# Patient Record
Sex: MALE | Race: BLACK OR AFRICAN AMERICAN | URBAN - METROPOLITAN AREA
[De-identification: names, ages, dates, MRNs, and addresses within clinical notes are randomized per-mention and may not be internally consistent; named-entity substitution may affect disease eponyms.]

---

## 2023-01-06 ENCOUNTER — WEB ENCOUNTER (OUTPATIENT)
Dept: URBAN - METROPOLITAN AREA CLINIC 88 | Facility: CLINIC | Age: 33
End: 2023-01-06

## 2023-01-06 ENCOUNTER — OFFICE VISIT (OUTPATIENT)
Dept: URBAN - METROPOLITAN AREA CLINIC 88 | Facility: CLINIC | Age: 33
End: 2023-01-06
Payer: COMMERCIAL

## 2023-01-06 ENCOUNTER — LAB OUTSIDE AN ENCOUNTER (OUTPATIENT)
Dept: URBAN - METROPOLITAN AREA CLINIC 88 | Facility: CLINIC | Age: 33
End: 2023-01-06

## 2023-01-06 VITALS
BODY MASS INDEX: 26.84 KG/M2 | DIASTOLIC BLOOD PRESSURE: 70 MMHG | WEIGHT: 187.5 LBS | HEIGHT: 70 IN | TEMPERATURE: 97.7 F | HEART RATE: 73 BPM | SYSTOLIC BLOOD PRESSURE: 108 MMHG

## 2023-01-06 DIAGNOSIS — Z79.1 NSAID LONG-TERM USE: ICD-10-CM

## 2023-01-06 DIAGNOSIS — R10.10 UPPER ABDOMINAL PAIN: ICD-10-CM

## 2023-01-06 DIAGNOSIS — K59.00 COLONIC CONSTIPATION: ICD-10-CM

## 2023-01-06 DIAGNOSIS — R11.0 NAUSEA: ICD-10-CM

## 2023-01-06 PROCEDURE — 99204 OFFICE O/P NEW MOD 45 MIN: CPT | Performed by: INTERNAL MEDICINE

## 2023-01-06 RX ORDER — PANTOPRAZOLE SODIUM 40 MG/1
1 TABLET TABLET, DELAYED RELEASE ORAL
Qty: 90 | Refills: 1 | OUTPATIENT
Start: 2023-01-06

## 2023-01-06 RX ORDER — POLYETHYLENE GLYCOL 3350, SODIUM CHLORIDE, SODIUM BICARBONATE, POTASSIUM CHLORIDE 420; 11.2; 5.72; 1.48 G/4L; G/4L; G/4L; G/4L
MIX AS DIRECTED.  DRINK ONE CUP EVERY 15 MINUTES UNTIL COMPLETE FOR BOWEL CLEANSING POWDER, FOR SOLUTION ORAL ONCE
Qty: 1 | Refills: 0 | OUTPATIENT
Start: 2023-01-06 | End: 2023-01-07

## 2023-01-06 NOTE — PHYSICAL EXAM GASTROINTESTINAL
Abdomen , soft, epigastric and LLQ tenderness, nondistended , no guarding or rigidity , no masses palpable , normal bowel sounds , Liver and Spleen: no hepatosplenomegaly

## 2023-01-06 NOTE — HPI-TODAY'S VISIT:
Presents today as a follow up from [facility] for evaluation of abdominal pain.  Evaluated in ED on 12/22/2022 due to worsening abdominal pain, nausea and vomiting.  CT A/P 12/22/2022 revealed no acute inflammatory etiologies but moderate stool  retention noted. Labs:  WBC 6.20, Hgb 13.9, MCV 92, , Normal LFTs, +THC Describes marijuana use as being sparingly.  Was discharged home on Carafate suspension and zofran.  Instructed to follow up with GI.    States pain has been occuring intermittently for over year.  Over the last month, feels pain has increased in frequency.  Abdominal pain is primarily to lower abdomen and epigastric region.  Describes it as a fullness that is worse after eating.  Nausea w/o vomiting also voiced.  Lying supine helps to alleviate his complaints.  Admits to excessive use of NSAIDs (specifically Goody's powders) that started in his early adulthood to help manage headaches.  Has changed diet since ER visit, eliminating pork and beef.  Feels since eliminating pork feels headaches are less.  Was also on medication for anxiety and PTSD.   Feels medications contributed to his constipation.  Discontinued use about 3 weeks ago.  Defecation occurs daily to every other day.  May not achieve a complete sense of evacution with defecation.  Unsure if signs of melena has been present since he does not observe his stool.  Denies prior colonoscopy and EGD.

## 2023-01-11 PROBLEM — 35298007: Status: ACTIVE | Noted: 2023-01-06

## 2023-01-23 ENCOUNTER — CLAIMS CREATED FROM THE CLAIM WINDOW (OUTPATIENT)
Dept: URBAN - METROPOLITAN AREA CLINIC 4 | Facility: CLINIC | Age: 33
End: 2023-01-23
Payer: COMMERCIAL

## 2023-01-23 ENCOUNTER — TELEPHONE ENCOUNTER (OUTPATIENT)
Dept: URBAN - METROPOLITAN AREA SURGERY CENTER 24 | Facility: SURGERY CENTER | Age: 33
End: 2023-01-23

## 2023-01-23 ENCOUNTER — CLAIMS CREATED FROM THE CLAIM WINDOW (OUTPATIENT)
Dept: URBAN - METROPOLITAN AREA SURGERY CENTER 24 | Facility: SURGERY CENTER | Age: 33
End: 2023-01-23
Payer: COMMERCIAL

## 2023-01-23 DIAGNOSIS — K29.60 ADENOPAPILLOMATOSIS GASTRICA: ICD-10-CM

## 2023-01-23 DIAGNOSIS — K21.9 GASTRO-ESOPHAGEAL REFLUX DISEASE WITHOUT ESOPHAGITIS: ICD-10-CM

## 2023-01-23 DIAGNOSIS — K26.3 ACUTE DUODENAL ULCER: ICD-10-CM

## 2023-01-23 DIAGNOSIS — K29.80 ACUTE DUODENITIS: ICD-10-CM

## 2023-01-23 DIAGNOSIS — K21.9 ACID REFLUX: ICD-10-CM

## 2023-01-23 DIAGNOSIS — K29.81 DUODENITIS WITH BLEEDING: ICD-10-CM

## 2023-01-23 PROCEDURE — 43239 EGD BIOPSY SINGLE/MULTIPLE: CPT | Performed by: INTERNAL MEDICINE

## 2023-01-23 PROCEDURE — 88305 TISSUE EXAM BY PATHOLOGIST: CPT | Performed by: PATHOLOGY

## 2023-01-23 PROCEDURE — 88312 SPECIAL STAINS GROUP 1: CPT | Performed by: PATHOLOGY

## 2023-01-23 PROCEDURE — 88342 IMHCHEM/IMCYTCHM 1ST ANTB: CPT | Performed by: PATHOLOGY

## 2023-01-23 PROCEDURE — G8907 PT DOC NO EVENTS ON DISCHARG: HCPCS | Performed by: INTERNAL MEDICINE

## 2023-01-23 RX ORDER — PANTOPRAZOLE SODIUM 40 MG/1
1 TABLET TABLET, DELAYED RELEASE ORAL
Qty: 90 | Refills: 1 | Status: ACTIVE | COMMUNITY
Start: 2023-01-06

## 2023-02-09 ENCOUNTER — TELEPHONE ENCOUNTER (OUTPATIENT)
Dept: URBAN - METROPOLITAN AREA CLINIC 92 | Facility: CLINIC | Age: 33
End: 2023-02-09

## 2023-02-09 RX ORDER — PANTOPRAZOLE SODIUM 40 MG/1
1 TABLET TABLET, DELAYED RELEASE ORAL ONCE A DAY
Qty: 14 TABLET | Refills: 0 | OUTPATIENT
Start: 2023-02-09

## 2023-02-09 RX ORDER — AMOXICILLIN 500 MG/1
2 CAPSULES CAPSULE ORAL
Qty: 56 CAPSULE | Refills: 0 | OUTPATIENT
Start: 2023-02-09 | End: 2023-02-23

## 2023-02-09 RX ORDER — CLARITHROMYCIN 500 MG/1
1 TABLET TABLET, FILM COATED ORAL
Qty: 28 TABLET | Refills: 0 | OUTPATIENT
Start: 2023-02-09 | End: 2023-02-23

## 2023-02-23 ENCOUNTER — OFFICE VISIT (OUTPATIENT)
Dept: URBAN - METROPOLITAN AREA CLINIC 70 | Facility: CLINIC | Age: 33
End: 2023-02-23
Payer: COMMERCIAL

## 2023-02-23 VITALS
BODY MASS INDEX: 26 KG/M2 | DIASTOLIC BLOOD PRESSURE: 82 MMHG | TEMPERATURE: 97.8 F | SYSTOLIC BLOOD PRESSURE: 125 MMHG | WEIGHT: 181.6 LBS | HEIGHT: 70 IN | HEART RATE: 56 BPM

## 2023-02-23 DIAGNOSIS — K26.9 DUODENAL ULCER: ICD-10-CM

## 2023-02-23 DIAGNOSIS — A04.8 H. PYLORI INFECTION: ICD-10-CM

## 2023-02-23 DIAGNOSIS — R10.13 EPIGASTRIC ABDOMINAL PAIN: ICD-10-CM

## 2023-02-23 PROBLEM — 51868009: Status: ACTIVE | Noted: 2023-02-23

## 2023-02-23 PROCEDURE — 99214 OFFICE O/P EST MOD 30 MIN: CPT

## 2023-02-23 RX ORDER — CLARITHROMYCIN 500 MG/1
1 TABLET TABLET, FILM COATED ORAL
Qty: 28 TABLET | Refills: 0 | Status: ON HOLD | COMMUNITY
Start: 2023-02-09 | End: 2023-02-23

## 2023-02-23 RX ORDER — CLARITHROMYCIN 500 MG/1
1 TABLET TABLET, FILM COATED ORAL
Qty: 28 TABLET | Refills: 0 | OUTPATIENT
Start: 2023-02-23 | End: 2023-03-09

## 2023-02-23 RX ORDER — PANTOPRAZOLE SODIUM 40 MG/1
1 TABLET TABLET, DELAYED RELEASE ORAL
Qty: 90 | Refills: 1 | Status: ON HOLD | COMMUNITY
Start: 2023-01-06

## 2023-02-23 RX ORDER — AMOXICILLIN 500 MG/1
2 CAPSULES CAPSULE ORAL
Qty: 56 CAPSULE | Refills: 0 | Status: ON HOLD | COMMUNITY
Start: 2023-02-09 | End: 2023-02-23

## 2023-02-23 RX ORDER — PANTOPRAZOLE SODIUM 40 MG/1
1 TABLET TABLET, DELAYED RELEASE ORAL ONCE A DAY
Qty: 14 TABLET | Refills: 0 | Status: ON HOLD | COMMUNITY
Start: 2023-02-09

## 2023-02-23 RX ORDER — PANTOPRAZOLE SODIUM 40 MG/1
1 TABLET TABLET, DELAYED RELEASE ORAL TWICE A DAY
Qty: 28 TABLET | Refills: 0 | OUTPATIENT
Start: 2023-02-23

## 2023-02-23 RX ORDER — AMOXICILLIN 500 MG/1
2 CAPSULES CAPSULE ORAL EVERY 12 HOURS
Qty: 56 CAPSULE | Refills: 0 | OUTPATIENT
Start: 2023-02-23 | End: 2023-03-09

## 2023-02-23 NOTE — HPI-TODAY'S VISIT:
The pt presents for a procedure f/u.  EGD 1/23/2023 showed mildly severe esophagitis, gastritis, and nonbleeding duodenal ulcer.  Biopsies were positive for H. pylori.  Treatment was sent to the pt pharmacy after path results were received.  Today he states that he never recieved procedure results and was unaware of the medications sent to his pharmacy.  He admits to continued epigastric abdominal pain and decreased appetite. He state has discontinued NSAID use since his LOV.  Denies melena, nausea, or vomiting.  Constipation has improved and he admits to a daily bowel movement.

## 2023-02-23 NOTE — HPI-OTHER HISTORIES
OV 1/6/2023 w/ NIRMALA Galankenya: Presents today as a follow up from [facility] for evaluation of abdominal pain.  Evaluated in ED on 12/22/2022 due to worsening abdominal pain, nausea and vomiting.  CT A/P 12/22/2022 revealed no acute inflammatory etiologies but moderate stool  retention noted. Labs:  WBC 6.20, Hgb 13.9, MCV 92, , Normal LFTs, +THC Describes marijuana use as being sparingly.  Was discharged home on Carafate suspension and zofran.  Instructed to follow up with GI.    States pain has been occuring intermittently for over year.  Over the last month, feels pain has increased in frequency.  Abdominal pain is primarily to lower abdomen and epigastric region.  Describes it as a fullness that is worse after eating.  Nausea w/o vomiting also voiced.  Lying supine helps to alleviate his complaints.  Admits to excessive use of NSAIDs (specifically Goody's powders) that started in his early adulthood to help manage headaches.  Has changed diet since ER visit, eliminating pork and beef.  Feels since eliminating pork feels headaches are less.  Was also on medication for anxiety and PTSD.   Feels medications contributed to his constipation.  Discontinued use about 3 weeks ago.  Defecation occurs daily to every other day.  May not achieve a complete sense of evacution with defecation.  Unsure if signs of melena has been present since he does not observe his stool.  Denies prior colonoscopy and EGD.

## 2023-02-27 ENCOUNTER — TELEPHONE ENCOUNTER (OUTPATIENT)
Dept: URBAN - METROPOLITAN AREA CLINIC 70 | Facility: CLINIC | Age: 33
End: 2023-02-27

## 2023-02-27 RX ORDER — LEVOFLOXACIN 500 MG/1
1 TABLET TABLET, FILM COATED ORAL ONCE A DAY
Qty: 14 TABLET | Refills: 0 | OUTPATIENT
Start: 2023-02-27 | End: 2023-03-13

## 2023-02-27 RX ORDER — CLARITHROMYCIN 500 MG/1
1 TABLET TABLET, FILM COATED ORAL
OUTPATIENT
Start: 2023-02-23 | End: 2023-03-09

## 2023-02-27 RX ORDER — PANTOPRAZOLE SODIUM 40 MG/1
1 TABLET TABLET, DELAYED RELEASE ORAL TWICE A DAY
Qty: 28 TABLET | Refills: 0 | OUTPATIENT
Start: 2023-02-27

## 2023-02-27 RX ORDER — AMOXICILLIN 250 MG/1
3 CAPSULES CAPSULE ORAL THREE TIMES A DAY
Qty: 126 CAPSULE | Refills: 0 | OUTPATIENT
Start: 2023-02-27 | End: 2023-03-13

## 2023-03-15 PROBLEM — 266433003: Status: ACTIVE | Noted: 2023-03-15

## 2023-03-16 ENCOUNTER — OFFICE VISIT (OUTPATIENT)
Dept: URBAN - METROPOLITAN AREA CLINIC 88 | Facility: CLINIC | Age: 33
End: 2023-03-16
Payer: COMMERCIAL

## 2023-03-16 ENCOUNTER — LAB OUTSIDE AN ENCOUNTER (OUTPATIENT)
Dept: URBAN - METROPOLITAN AREA CLINIC 88 | Facility: CLINIC | Age: 33
End: 2023-03-16

## 2023-03-16 VITALS
BODY MASS INDEX: 25.51 KG/M2 | DIASTOLIC BLOOD PRESSURE: 72 MMHG | TEMPERATURE: 98.1 F | HEART RATE: 60 BPM | SYSTOLIC BLOOD PRESSURE: 116 MMHG | WEIGHT: 178.2 LBS | HEIGHT: 70 IN

## 2023-03-16 DIAGNOSIS — R10.32 LEFT LOWER QUADRANT ABDOMINAL PAIN: ICD-10-CM

## 2023-03-16 DIAGNOSIS — K26.9 DUODENAL ULCER: ICD-10-CM

## 2023-03-16 DIAGNOSIS — K59.00 COLONIC CONSTIPATION: ICD-10-CM

## 2023-03-16 DIAGNOSIS — R14.0 BLOATING: ICD-10-CM

## 2023-03-16 DIAGNOSIS — A04.8 HELICOBACTER PYLORI INFECTION: ICD-10-CM

## 2023-03-16 DIAGNOSIS — K21.00 GASTROESOPHAGEAL REFLUX DISEASE WITH ESOPHAGITIS WITHOUT HEMORRHAGE: ICD-10-CM

## 2023-03-16 PROCEDURE — 99214 OFFICE O/P EST MOD 30 MIN: CPT | Performed by: NURSE PRACTITIONER

## 2023-03-16 RX ORDER — PANTOPRAZOLE SODIUM 40 MG/1
1 TABLET TABLET, DELAYED RELEASE ORAL ONCE A DAY
Qty: 14 TABLET | Refills: 0 | Status: DISCONTINUED | COMMUNITY
Start: 2023-02-09

## 2023-03-16 RX ORDER — PANTOPRAZOLE SODIUM 40 MG/1
1 TABLET TABLET, DELAYED RELEASE ORAL
Qty: 90 | Refills: 1
Start: 2023-02-27

## 2023-03-16 RX ORDER — PANTOPRAZOLE SODIUM 40 MG/1
1 TABLET TABLET, DELAYED RELEASE ORAL
Qty: 90 | Refills: 1 | Status: DISCONTINUED | COMMUNITY
Start: 2023-01-06

## 2023-03-16 RX ORDER — PANTOPRAZOLE SODIUM 40 MG/1
1 TABLET TABLET, DELAYED RELEASE ORAL TWICE A DAY
Qty: 28 TABLET | Refills: 0 | Status: DISCONTINUED | COMMUNITY
Start: 2023-02-27

## 2023-03-16 RX ORDER — PANTOPRAZOLE SODIUM 40 MG/1
1 TABLET TABLET, DELAYED RELEASE ORAL TWICE A DAY
Qty: 28 TABLET | Refills: 0 | Status: DISCONTINUED | COMMUNITY
Start: 2023-02-23

## 2023-03-16 NOTE — HPI-TODAY'S VISIT:
Presents today for follow up regarding h. pylori.  Was able to complete 100% of antibiotics for treatment.  States completed medication about 2 days ago.  Since then, has noticed onset of LLQ and epigastric pain.  Describes pain as hurting pain that lasts for minutes before resolving.  Continues to avoid use of NSAIDs.  Denies any alleviating factors.  Pain may be worse prior to meals but improves once he eats.  Defecation occurs daily to every other day with occasional straining voiced.  Voices a complete sense of evacuation.  Has noticed gradual increase in abdominal bloating despite daily BMs.

## 2023-03-16 NOTE — HPI-OTHER HISTORIES
--------------------------------------------------------------- Last office note 02/23/2023 by MARIA ELENA Daly: The pt presents for a procedure f/u.  EGD 1/23/2023 showed mildly severe esophagitis, gastritis, and nonbleeding duodenal ulcer.  Biopsies were positive for H. pylori.  Treatment was sent to the pt pharmacy after path results were received.  Today he states that he never recieved procedure results and was unaware of the medications sent to his pharmacy.  He admits to continued epigastric abdominal pain and decreased appetite. He state has discontinued NSAID use since his LOV.  Denies melena, nausea, or vomiting.  Constipation has improved and he admits to a daily bowel movement.

## 2023-03-23 ENCOUNTER — TELEPHONE ENCOUNTER (OUTPATIENT)
Dept: URBAN - METROPOLITAN AREA CLINIC 35 | Facility: CLINIC | Age: 33
End: 2023-03-23

## 2023-03-23 ENCOUNTER — CLAIMS CREATED FROM THE CLAIM WINDOW (OUTPATIENT)
Dept: URBAN - METROPOLITAN AREA CLINIC 4 | Facility: CLINIC | Age: 33
End: 2023-03-23
Payer: COMMERCIAL

## 2023-03-23 ENCOUNTER — CLAIMS CREATED FROM THE CLAIM WINDOW (OUTPATIENT)
Dept: URBAN - METROPOLITAN AREA SURGERY CENTER 24 | Facility: SURGERY CENTER | Age: 33
End: 2023-03-23

## 2023-03-23 ENCOUNTER — CLAIMS CREATED FROM THE CLAIM WINDOW (OUTPATIENT)
Dept: URBAN - METROPOLITAN AREA SURGERY CENTER 24 | Facility: SURGERY CENTER | Age: 33
End: 2023-03-23
Payer: COMMERCIAL

## 2023-03-23 DIAGNOSIS — K21.9 ACID REFLUX: ICD-10-CM

## 2023-03-23 DIAGNOSIS — K21.9 GASTRO-ESOPHAGEAL REFLUX DISEASE WITHOUT ESOPHAGITIS: ICD-10-CM

## 2023-03-23 DIAGNOSIS — K31.89 ACQUIRED DEFORMITY OF DUODENUM: ICD-10-CM

## 2023-03-23 DIAGNOSIS — Z87.11 H/O GASTRIC ULCER: ICD-10-CM

## 2023-03-23 DIAGNOSIS — K29.60 ADENOPAPILLOMATOSIS GASTRICA: ICD-10-CM

## 2023-03-23 DIAGNOSIS — K31.A0 GASTRIC INTESTINAL METAPLASIA, UNSPECIFIED: ICD-10-CM

## 2023-03-23 PROCEDURE — G8907 PT DOC NO EVENTS ON DISCHARG: HCPCS | Performed by: INTERNAL MEDICINE

## 2023-03-23 PROCEDURE — 88305 TISSUE EXAM BY PATHOLOGIST: CPT | Performed by: PATHOLOGY

## 2023-03-23 PROCEDURE — 88312 SPECIAL STAINS GROUP 1: CPT | Performed by: PATHOLOGY

## 2023-03-23 PROCEDURE — 88342 IMHCHEM/IMCYTCHM 1ST ANTB: CPT | Performed by: PATHOLOGY

## 2023-03-23 PROCEDURE — 43239 EGD BIOPSY SINGLE/MULTIPLE: CPT | Performed by: INTERNAL MEDICINE

## 2023-03-23 RX ORDER — OMEPRAZOLE 40 MG/1
1 CAPSULE 30 MINUTES BEFORE MORNING MEAL CAPSULE, DELAYED RELEASE ORAL ONCE A DAY
Qty: 60 | Refills: 0 | OUTPATIENT
Start: 2023-03-23

## 2023-03-23 RX ORDER — PANTOPRAZOLE SODIUM 40 MG/1
1 TABLET TABLET, DELAYED RELEASE ORAL
Qty: 90 | Refills: 1 | Status: ACTIVE | COMMUNITY
Start: 2023-02-27

## 2023-03-23 RX ORDER — FLUCONAZOLE 100 MG/1
1 TABLET TABLET ORAL ONCE DAILY
Qty: 7 | Refills: 1 | OUTPATIENT
Start: 2023-03-23 | End: 2023-04-02

## 2024-02-21 ENCOUNTER — OV EP (OUTPATIENT)
Dept: URBAN - METROPOLITAN AREA CLINIC 88 | Facility: CLINIC | Age: 34
End: 2024-02-21

## 2024-02-21 RX ORDER — PANTOPRAZOLE SODIUM 40 MG/1
1 TABLET TABLET, DELAYED RELEASE ORAL
Qty: 90 | Refills: 1 | Status: ACTIVE | COMMUNITY
Start: 2023-02-27

## 2024-02-21 RX ORDER — OMEPRAZOLE 40 MG/1
1 CAPSULE 30 MINUTES BEFORE MORNING MEAL CAPSULE, DELAYED RELEASE ORAL ONCE A DAY
Qty: 60 | Refills: 0 | Status: ACTIVE | COMMUNITY
Start: 2023-03-23

## 2024-02-21 NOTE — HPI-OTHER HISTORIES
----------------------------------------------------------------- Last office note 03/16/2023: Presents today for follow up regarding h. pylori. Was able to complete 100% of antibiotics for treatment. States completed medication about 2 days ago. Since then, has noticed onset of LLQ and epigastric pain. Describes pain as hurting pain that lasts for minutes before resolving. Continues to avoid use of NSAIDs. Denies any alleviating factors. Pain may be worse prior to meals but improves once he eats. Defecation occurs daily to every other day with occasional straining voiced. Voices a complete sense of evacuation. Has noticed gradual increase in abdominal bloating despite daily BMs.

## 2024-03-06 ENCOUNTER — OV EP (OUTPATIENT)
Dept: URBAN - METROPOLITAN AREA CLINIC 88 | Facility: CLINIC | Age: 34
End: 2024-03-06

## 2024-03-06 RX ORDER — PANTOPRAZOLE SODIUM 40 MG/1
1 TABLET TABLET, DELAYED RELEASE ORAL
Qty: 90 | Refills: 1 | Status: ACTIVE | COMMUNITY
Start: 2023-02-27

## 2024-03-06 RX ORDER — OMEPRAZOLE 40 MG/1
1 CAPSULE 30 MINUTES BEFORE MORNING MEAL CAPSULE, DELAYED RELEASE ORAL ONCE A DAY
Qty: 60 | Refills: 0 | Status: ACTIVE | COMMUNITY
Start: 2023-03-23